# Patient Record
Sex: MALE | Race: WHITE | Employment: FULL TIME | URBAN - METROPOLITAN AREA
[De-identification: names, ages, dates, MRNs, and addresses within clinical notes are randomized per-mention and may not be internally consistent; named-entity substitution may affect disease eponyms.]

---

## 2019-08-19 ENCOUNTER — OFFICE VISIT (OUTPATIENT)
Dept: HEMATOLOGY | Age: 59
End: 2019-08-19

## 2019-08-19 ENCOUNTER — HOSPITAL ENCOUNTER (OUTPATIENT)
Dept: LAB | Age: 59
Discharge: HOME | End: 2019-08-19
Payer: COMMERCIAL

## 2019-08-19 VITALS
DIASTOLIC BLOOD PRESSURE: 78 MMHG | HEIGHT: 73 IN | SYSTOLIC BLOOD PRESSURE: 132 MMHG | WEIGHT: 259.25 LBS | HEART RATE: 73 BPM | OXYGEN SATURATION: 97 % | TEMPERATURE: 98.4 F | BODY MASS INDEX: 34.36 KG/M2

## 2019-08-19 DIAGNOSIS — R74.8 ELEVATED LIVER ENZYMES: ICD-10-CM

## 2019-08-19 DIAGNOSIS — R74.8 ELEVATED LIVER ENZYMES: Primary | ICD-10-CM

## 2019-08-19 PROBLEM — M54.32 SCIATICA OF LEFT SIDE: Status: ACTIVE | Noted: 2019-08-19

## 2019-08-19 PROBLEM — Z90.49 S/P APPENDECTOMY: Status: ACTIVE | Noted: 2019-08-19

## 2019-08-19 PROBLEM — E66.9 OBESITY: Status: ACTIVE | Noted: 2019-08-19

## 2019-08-19 LAB
ALBUMIN SERPL-MCNC: 4.1 G/DL (ref 3.4–5)
ALBUMIN/GLOB SERPL: 1.3 {RATIO} (ref 0.8–1.7)
ALP SERPL-CCNC: 65 U/L (ref 45–117)
ALT SERPL-CCNC: 44 U/L (ref 16–61)
ANION GAP SERPL CALC-SCNC: 9 MMOL/L (ref 3–18)
AST SERPL-CCNC: 18 U/L (ref 10–38)
BASOPHILS # BLD: 0 K/UL (ref 0–0.1)
BASOPHILS NFR BLD: 0 % (ref 0–2)
BILIRUB DIRECT SERPL-MCNC: 0.4 MG/DL (ref 0–0.2)
BILIRUB SERPL-MCNC: 1.8 MG/DL (ref 0.2–1)
BUN SERPL-MCNC: 13 MG/DL (ref 7–18)
BUN/CREAT SERPL: 16 (ref 12–20)
CALCIUM SERPL-MCNC: 8.5 MG/DL (ref 8.5–10.1)
CHLORIDE SERPL-SCNC: 104 MMOL/L (ref 100–111)
CO2 SERPL-SCNC: 29 MMOL/L (ref 21–32)
CREAT SERPL-MCNC: 0.8 MG/DL (ref 0.6–1.3)
DIFFERENTIAL METHOD BLD: ABNORMAL
EOSINOPHIL # BLD: 0 K/UL (ref 0–0.4)
EOSINOPHIL NFR BLD: 0 % (ref 0–5)
ERYTHROCYTE [DISTWIDTH] IN BLOOD BY AUTOMATED COUNT: 13 % (ref 11.6–14.5)
FERRITIN SERPL-MCNC: 289 NG/ML (ref 8–388)
GLOBULIN SER CALC-MCNC: 3.2 G/DL (ref 2–4)
GLUCOSE SERPL-MCNC: 73 MG/DL (ref 74–99)
HCT VFR BLD AUTO: 42.5 % (ref 36–48)
HGB BLD-MCNC: 14 G/DL (ref 13–16)
IRON SATN MFR SERPL: 43 %
IRON SERPL-MCNC: 139 UG/DL (ref 50–175)
LYMPHOCYTES # BLD: 1.8 K/UL (ref 0.9–3.6)
LYMPHOCYTES NFR BLD: 19 % (ref 21–52)
MCH RBC QN AUTO: 29.3 PG (ref 24–34)
MCHC RBC AUTO-ENTMCNC: 32.9 G/DL (ref 31–37)
MCV RBC AUTO: 88.9 FL (ref 74–97)
MONOCYTES # BLD: 0.6 K/UL (ref 0.05–1.2)
MONOCYTES NFR BLD: 7 % (ref 3–10)
NEUTS SEG # BLD: 7.1 K/UL (ref 1.8–8)
NEUTS SEG NFR BLD: 74 % (ref 40–73)
PLATELET # BLD AUTO: 225 K/UL (ref 135–420)
PMV BLD AUTO: 10.3 FL (ref 9.2–11.8)
POTASSIUM SERPL-SCNC: 3.8 MMOL/L (ref 3.5–5.5)
PROT SERPL-MCNC: 7.3 G/DL (ref 6.4–8.2)
RBC # BLD AUTO: 4.78 M/UL (ref 4.7–5.5)
SODIUM SERPL-SCNC: 142 MMOL/L (ref 136–145)
TIBC SERPL-MCNC: 326 UG/DL (ref 250–450)
WBC # BLD AUTO: 9.6 K/UL (ref 4.6–13.2)

## 2019-08-19 PROCEDURE — 80048 BASIC METABOLIC PNL TOTAL CA: CPT

## 2019-08-19 PROCEDURE — 86704 HEP B CORE ANTIBODY TOTAL: CPT

## 2019-08-19 PROCEDURE — 83516 IMMUNOASSAY NONANTIBODY: CPT

## 2019-08-19 PROCEDURE — 86706 HEP B SURFACE ANTIBODY: CPT

## 2019-08-19 PROCEDURE — 83540 ASSAY OF IRON: CPT

## 2019-08-19 PROCEDURE — 86256 FLUORESCENT ANTIBODY TITER: CPT

## 2019-08-19 PROCEDURE — 85025 COMPLETE CBC W/AUTO DIFF WBC: CPT

## 2019-08-19 PROCEDURE — 36415 COLL VENOUS BLD VENIPUNCTURE: CPT

## 2019-08-19 PROCEDURE — 87340 HEPATITIS B SURFACE AG IA: CPT

## 2019-08-19 PROCEDURE — 86708 HEPATITIS A ANTIBODY: CPT

## 2019-08-19 PROCEDURE — 86038 ANTINUCLEAR ANTIBODIES: CPT

## 2019-08-19 PROCEDURE — 86803 HEPATITIS C AB TEST: CPT

## 2019-08-19 PROCEDURE — 82728 ASSAY OF FERRITIN: CPT

## 2019-08-19 PROCEDURE — 82103 ALPHA-1-ANTITRYPSIN TOTAL: CPT

## 2019-08-19 PROCEDURE — 80076 HEPATIC FUNCTION PANEL: CPT

## 2019-08-19 NOTE — PROGRESS NOTES
[address] Eleanor Slater HospitalMD Nancylee Crater, Francisco Ferron, MD America Lemons, PA-C Charm Carrier, [facility]-BC     April ORLANDO Lopez, [facility]   DRE JaimesP-ROBIN Long, [facility]       Frank Bloom [facility] [address]    at [facility] AT [address], [facility] [address]  [address].    [phone]    FAX: [address] [place] [address], [address]    [phone]    FAX: [phone]       Patient Care Team:  Lorie Polo, Magdaleno18 Faith Diaz as PCP - General (Nurse Practitioner)      Problem List  Never Reviewed          Codes Class Noted    Elevated liver enzymes ICD-10-CM: R74.8  ICD-9-CM: 790.5  8/19/2019        S/P appendectomy ICD-10-CM: Z90.49  ICD-9-CM: V45.89  8/19/2019        Sciatica of left side ICD-10-CM: M54.32  ICD-9-CM: 724.3  8/19/2019        Obesity ICD-10-CM: E66.9  ICD-9-CM: 278.00  8/19/2019              The clinicians listed above have asked me to see Brando Martell in consultation regarding elevated liver enzymes and its management. All medical records sent by the referring physicians were reviewed including laboratory studies     The patient is a 63 y.o.  male who was found to have elevated  liver enzymes in 2/2019 during his routine annual medical exam.    Serologic evaluation for markers of chronic liver disease was not performed. Imaging of the liver was not performed. An assessment of liver fibrosis with biopsy or elastography has not been performed. The patient had not started any new medications within 3 months preceding the elevation in liver chemistries. The patient has no symptoms which can be attributed to the liver disorder.     The patient has not experienced the following symptoms:  fatigue, pain in the right side over the liver, yellowing of the eyes or skin,     The patient completes all daily activities without any functional limitations. ASSESSMENT AND PLAN:  Elevated liver enzymes  Intermittent elevation in liver transaminases of unclear etiology at this time. Liver transaminases are now normal.  ALP is normal.  Liver function is normal.  Total bilirubin is   elevated. The platelet count is normal.      Serologic testing for causes of chronic liver disease were negative    Based upon laboratory studies Elastography, and imaging the patient may have significant liver injury. [place] Syndrome  There is a mild elevation in total bilirubin that is mostly indirect fraction consistent with [place] disease. This benign genetic disorder of bilirubin conjugation has no clinical significance. Screening for Hepatocellular Carcinoma  HCC screening is not necessary if the patient has no evidence of cirrhosis. Treatment of other medical problems in patients with chronic liver disease  There are no contraindications for the patient to take most medications that are necessary for treatment of other medical issues. Vaccinations   Since the patient does not have a chronic liver disease which can lead to liver injury screening for HAV and HBV is not needed. Routine vaccinations against other bacterial and viral agents can be performed as indicated. Annual flu vaccination should be administered if indicated. ALLERGIES  Allergies   Allergen Reactions    Penicillins Other (comments)     unkown       MEDICATIONS  No current outpatient medications on file. No current facility-administered medications for this visit. SYSTEM REVIEW NOT RELATED TO LIVER DISEASE OR REVIEWED ABOVE:  Constitution systems: Negative for fever, chills, weight gain, weight loss. Eyes: Negative for visual changes. ENT: Negative for sore throat, painful swallowing.    Respiratory: Negative for cough, hemoptysis, SOB. Cardiology: Negative for chest pain, palpitations. GI:  Negative for constipation or diarrhea. : Negative for urinary frequency, dysuria, hematuria, nocturia. Skin: Negative for rash. Hematology: Negative for easy bruising, blood clots. Musculo-skelatal: Negative for back pain, muscle pain, weakness. Neurologic: Negative for headaches, dizziness, vertigo, memory problems not related to HE. Psychology: Negative for anxiety, depression. FAMILY HISTORY:  The father Has the following following chronic disease(s): Prostate cancer, COPD,   The mother Has the following chronic disease(s): Arthritis  There is no family history of liver disease. There is no family history of immune disorders. SOCIAL HISTORY:  The patient is . The patient has one step child  The patient does not smoke cigarettes. The patient rarely drinks alcohol  The patient currently works full time as a . PHYSICAL EXAMINATION:  Visit Vitals  /78   Pulse 73   Temp 98.4 °F (36.9 °C) (Tympanic)   Ht 6' 1\" (1.854 m)   Wt 259 lb 4 oz (117.6 kg)   SpO2 97%   BMI 34.20 kg/m²     General: No acute distress. Eyes: Sclera anicteric. ENT: No oral lesions. Thyroid normal.  Nodes: No adenopathy. Skin: No spider angiomata. No jaundice. No palmar erythema. Respiratory: Lungs clear to auscultation. Cardiovascular: Regular heart rate. No murmurs. No JVD. Abdomen: Soft non-tender. Liver size normal to percussion/palpation. Spleen not palpable. No obvious ascites. Extremities: No edema. No muscle wasting. No gross arthritic changes. Neurologic: Alert and oriented. Cranial nerves grossly intact. No asterixis.     LABORATORY STUDIES:  From 2/2019  AST/ALT/ALP/T Bili/ALB: 36/50/71/? / 5.1  WBC/HB/PLT/INR:8.0/14.5/201/  HbA1c: 5.6    Liver [place] of [address] Units 8/19/2019   WBC 4.6 - 13.2 K/uL 9.6   ANC 1.8 - 8.0 K/UL 7.1   HGB 13.0 - 16.0 g/dL 14.0  - 420 K/uL 225   AST 10 - 38 U/L 18   ALT 16 - 61 U/L 44   Alk Phos 45 - 117 U/L 65   Bili, Total 0.2 - 1.0 MG/DL 1.8 (H)   Bili, Direct 0.0 - 0.2 MG/DL 0.4 (H)   Albumin 3.4 - 5.0 g/dL 4.1   BUN 7.0 - 18 MG/DL 13   Creat 0.6 - 1.3 MG/DL 0.80   Na 136 - 145 mmol/L 142   K 3.5 - 5.5 mmol/L 3.8   Cl 100 - 111 mmol/L 104   CO2 21 - 32 mmol/L 29   Glucose 74 - 99 mg/dL 73 (L)     SEROLOGIES:  Serologies Latest Ref Rng & Units 8/19/2019   Hep A Ab, Total NEGATIVE   NEGATIVE   Hep B Surface Ag <1.00 Index <0.10   Hep B Surface Ag Interp NEG   NEGATIVE   Hep B Core Ab, Total NEGATIVE   NEGATIVE   Hep B Surface Ab >10.0 mIU/mL <3.10 (L)   Hep B Surface Ab Interp POS   NEGATIVE (A)   Hep C Ab 0.0 - 0.9 s/co ratio 0.1   Ferritin 8 - 388 NG/   Iron % Saturation % 43   AMELIE, IFA  NEGATIVE   C-ANCA Neg:<1:20 titer <1:20   P-ANCA Neg:<1:20 titer <1:20   ANCA Neg:<1:20 titer <1:20   ASMCA 0 - 19 Units 5   M2 Ab 0.0 - 20.0 Units <20.0   Alpha-1 antitrypsin level 90 - 200 mg/dL 112     LIVER HISTOLOGY:  8/2019. Sheer wave elastography performed at THE [facility]. E Range: 7.28-10.69 kPa, E Mean: 8.59 kPa, E Median: 8.04 kPa, E Std: 1.16 kPa. The results suggested a fibrosis level of F2.    ENDOSCOPIC PROCEDURES:  Not available or performed    RADIOLOGY:  8/2019. Ultrasound of liver. Echogenic consistent with chronic liver disease. No liver mass lesions. No dilated bile ducts. No ascites. OTHER TESTING:  Not available or performed    FOLLOW-UP:  All of the issues listed above in the Assessment and Plan were discussed with the patient. All questions were answered. The patient expressed a clear understanding of the above. [address] [address] in 4 weeks for elastography and to review all data and determine the treatment plan.       Marcie Garza MD, MPH  [address]  [address]  [address].  [facility] Jacy Ledbetter MD  I have personally interviewed and examined the patient during the encounter. I have explained the key findings related to the liver disease and other pertinent diagnoses as noted above to the patient and answered all questions. I have reviewed and agree with the findings documented above, including all diagnostic interpretations, and plans. I have edited the original note as appropriate for clarity.     Ajit Matos MD  [address] [facility] [address]  [facility], [address] [place] [address] Kristin Lazar, [address]  [address] [place] [address]

## 2019-08-20 LAB
A1AT SERPL-MCNC: 112 MG/DL (ref 90–200)
ACTIN IGG SERPL-ACNC: 5 UNITS (ref 0–19)
COMMENT, 144067: NORMAL
HAV AB SER QL IA: NEGATIVE
HBV CORE AB SERPL QL IA: NEGATIVE
HBV SURFACE AB SER QL IA: NEGATIVE
HBV SURFACE AB SERPL IA-ACNC: <3.1 MIU/ML
HBV SURFACE AG SER QL: <0.1 INDEX
HBV SURFACE AG SER QL: NEGATIVE
HCV AB S/CO SERPL IA: 0.1 S/CO RATIO (ref 0–0.9)
HEP BS AB COMMENT,HBSAC: ABNORMAL
MITOCHONDRIA M2 IGG SER-ACNC: <20 UNITS (ref 0–20)

## 2019-08-21 LAB
ANA TITR SER IF: NEGATIVE {TITER}
C-ANCA TITR SER IF: NORMAL TITER
P-ANCA ATYPICAL TITR SER IF: NORMAL TITER
P-ANCA TITR SER IF: NORMAL TITER

## 2019-08-30 ENCOUNTER — HOSPITAL ENCOUNTER (OUTPATIENT)
Dept: ULTRASOUND IMAGING | Age: 59
Discharge: HOME | End: 2019-08-30
Payer: COMMERCIAL

## 2019-08-30 DIAGNOSIS — R74.8 ELEVATED LIVER ENZYMES: ICD-10-CM

## 2019-08-30 PROCEDURE — 76981 USE PARENCHYMA: CPT

## 2019-09-17 ENCOUNTER — OFFICE VISIT (OUTPATIENT)
Dept: HEMATOLOGY | Age: 59
End: 2019-09-17

## 2019-09-17 VITALS
DIASTOLIC BLOOD PRESSURE: 77 MMHG | WEIGHT: 249 LBS | HEIGHT: 73 IN | BODY MASS INDEX: 33 KG/M2 | OXYGEN SATURATION: 98 % | HEART RATE: 68 BPM | RESPIRATION RATE: 16 BRPM | TEMPERATURE: 96.9 F | SYSTOLIC BLOOD PRESSURE: 109 MMHG

## 2019-09-17 DIAGNOSIS — K76.0 FATTY LIVER: Primary | ICD-10-CM

## 2019-09-17 NOTE — PROGRESS NOTES
Scot Joseph is a 63 y.o. male      1. Have you been to the ER, urgent care clinic or hospitalized since your last visit? NO.     2. Have you seen or consulted any other health care providers outside of the [address] [place] [address] since your last visit (Include any pap smears or colon screening)?  NO          Learning Assessment 9/17/2019   PRIMARY LEARNER Patient   BARRIERS PRIMARY LEARNER NONE   CO-LEARNER CAREGIVER No   PRIMARY LANGUAGE ENGLISH   LEARNER PREFERENCE PRIMARY LISTENING   ANSWERED BY PATIENT   RELATIONSHIP SELF

## 2019-09-17 NOTE — PROGRESS NOTES
[address] MD, Jaquan Guerra MD Perfecto Se, PA-C Roma Genta, [facility]     Amanda Lopez, [facility]   Arthurlong Cisneros P-ROBIN    Denise Lemons, [address], [address] [address]  [address].    [phone]    FAX: [address] [place] [address], [address]    [phone]    FAX: [phone]     Patient Care Team:  Tereso Hoff as PCP - General (Nurse Practitioner)    Problem List  Never Reviewed          Codes Class Noted    Fatty liver ICD-10-CM: K76.0  ICD-9-CM: 571.8  8/19/2019        S/P appendectomy ICD-10-CM: Z90.49  ICD-9-CM: V45.89  8/19/2019        Sciatica of left side ICD-10-CM: M54.32  ICD-9-CM: 724.3  8/19/2019        Obesity ICD-10-CM: E66.9  ICD-9-CM: 278.00  8/19/2019              Georges Oakes returns to the [address] for management of suspected fatty liver. The active problem list, all pertinent past medical history, medications, liver histology, radiologic findings, and laboratory findings related to the liver disorder were reviewed with the patient. The patient is a 63 y.o.  male who was found to have elevated  liver enzymes in 2/2019 during his routine annual medical exam.    Serologic evaluation for markers of chronic liver disease was negative. The most recent imaging of the liver was Ultrasound performed in 8/2019. Results suggest fatty liver disease. Assessment of liver fibrosis was performed with shear wave elastography at THE [facility] in 8/2019. The result was E mean 8.59 kPa which correlates with mild to moderate fibrosis.     The patient had not started any new medications within 3 months preceding the elevation in liver chemistries. The patient has no symptoms which can be attributed to the liver disorder. The patient has not experienced the following symptoms:  fatigue, pain in the right side over the liver,   yellowing of the eyes or skin,     The patient completes all daily activities without any functional limitations. ASSESSMENT AND PLAN:  Fatty liver  Suspect the patient has fatty liver based upon imaging, serologic studies that are negative for other causes of chronic liver disease,   Elastography performed in 8/2019 suggests stage 2 septal fibrosis. NAFL is a benign form of fatty liver disease and not thought to progress to fibrosis or cirrhosis. If the patient loses 20% of current body weight, which is 51 pounds, down to a weight of 198 pounds, all steatosis will have resolved. Once all steatosis has resolved all inflammation will resolve. Then all fibrosis will gradually resolve and the liver could eventually be normal.    Liver transaminases are normal. ALP is normal. Liver function is normal. The platelet count is normal.    Based upon laboratory studies and imaging  the patient does not appear to have significant liver injury. Serologic testing for causes of chronic liver disease were ordered. All were negative. The need to assess liver fibrosis was discussed. Shear wave elastography can assess liver fibrosis and determine if a patient has advanced fibrosis or cirrhosis without the need for liver biopsy. The elastography can be repeated annually or as often as clinically indicated to assess for fibrosis progression and/or regression. Only a liver biopsy can confirm if the patient does have fatty liver and differentiate NAFL from GARY. The treatment is the same; weight reduction. If the liver biopsy demonstrates GARY the patient could be eligible for enrollment into clinical trials for treatment of GARY.     The need to perform a liver biopsy to help determine the cause and severity of the liver test abnormalities was discussed. The risks of performing the liver biopsy including pain, puncture of the lung, gallbladder, intestine or kidney and bleeding were discussed. There is no reason to perform liver biopsy at this time. Liver chemistries will be monitored. If the liver enzymes remain persistently elevated over the next 1-2 years a liver biopsy should be performed to ensure there is no ongoing chronic liver disease. Counseling for diet and weight loss in patients with confirmed or suspected NAFLD  There is currently no FDA approved medical treatment for fatty liver, NALFD or GARY. The patient was counseled regarding diet and exercise to achieve weight loss. The best diet for patients with fatty liver is one very low in carbohydrates and enriched with protein such as an [employer] program.      He has already lost 10 pounds since his initial visit 8/2019. The patient was told not to consume any food products and drinks containing fructose as this enhances hepatic fat synthesis. There is no medication or vitamin supplements that we advocate for GARY. Using glitazones in patients without diabetes mellitus has been shown to reduce fat content in the liver but has no effect on fibrosis and is associated with weight gain. Vitamin E has also been used but the data is not very good and most experts no longer advocate this. The only medical treatments for GARY are though clinical trials. The patient would be a good candidate for enrollment into a clinical trial if found to have GARY. We will continue to monitor the patient at periodic intervals. If weight loss is successful the fat will resolve from the liver and liver enzymes should return to the normal range.   We would then repeat the ultrasound and Fibroscan to see if this also returns to normal.      [place] Syndrome  There is a mild elevation in total bilirubin that is mostly indirect fraction consistent with [place] disease. This benign genetic disorder of bilirubin conjugation has no clinical significance. Screening for Hepatocellular Carcinoma  HCC screening is not necessary if the patient has no evidence of cirrhosis. Treatment of other medical problems in patients with chronic liver disease  There are no contraindications for the patient to take most medications that are necessary for treatment of other medical issues. Vaccinations   Vaccination for viral hepatitis A and B is recommended since the patient has no serologic evidence of previous exposure or vaccination with immunity. Routine vaccinations against other bacterial and viral agents can be performed as indicated. Annual flu vaccination should be administered if indicated. ALLERGIES  Allergies   Allergen Reactions    Penicillins Other (comments)     unkown       MEDICATIONS  No current outpatient medications on file. No current facility-administered medications for this visit. SYSTEM REVIEW NOT RELATED TO LIVER DISEASE OR REVIEWED ABOVE:  Constitution systems: Negative for fever, chills, weight gain, weight loss. Eyes: Negative for visual changes. ENT: Negative for sore throat, painful swallowing. Respiratory: Negative for cough, hemoptysis, SOB. Cardiology: Negative for chest pain, palpitations. GI:  Negative for constipation or diarrhea. : Negative for urinary frequency, dysuria, hematuria, nocturia. Skin: Negative for rash. Hematology: Negative for easy bruising, blood clots. Musculo-skelatal: Negative for back pain, muscle pain, weakness. Neurologic: Negative for headaches, dizziness, vertigo, memory problems not related to HE. Psychology: Negative for anxiety, depression.        FAMILY HISTORY:  The father has the following following chronic disease(s): Prostate cancer, COPD  The mother has the following chronic disease(s): Arthritis  There is no family history of liver disease. There is no family history of immune disorders. SOCIAL HISTORY:  The patient is . The patient has one step child  The patient does not smoke cigarettes. The patient rarely drinks alcohol  The patient currently works full time as a . PHYSICAL EXAMINATION:  Visit Vitals  /77 (BP 1 Location: Right arm, BP Patient Position: Sitting)   Pulse 68   Temp 96.9 °F (36.1 °C) (Tympanic)   Resp 16   Ht 6' 1\" (1.854 m)   Wt 249 lb (112.9 kg)   SpO2 98%   BMI 32.85 kg/m²     General: No acute distress. Eyes: Sclera anicteric. ENT: No oral lesions. Thyroid normal.  Nodes: No adenopathy. Skin: No spider angiomata. No jaundice. No palmar erythema. Respiratory: Lungs clear to auscultation. Cardiovascular: Regular heart rate. No murmurs. No JVD. Abdomen: Soft non-tender. Liver size normal to percussion/palpation. Spleen not palpable. No obvious ascites. Extremities: No edema. No muscle wasting. No gross arthritic changes. Neurologic: Alert and oriented. Cranial nerves grossly intact. No asterixis.     LABORATORY STUDIES:  From 2/2019  AST/ALT/ALP/T Bili/ALB: 36/50/71/? / 5.1  WBC/HB/PLT/INR:8.0/14.5/201/  BUN/CREAT:  HbA1c: 5.6    Liver [place] of [address] Units 8/19/2019   WBC 4.6 - 13.2 K/uL 9.6   ANC 1.8 - 8.0 K/UL 7.1   HGB 13.0 - 16.0 g/dL 14.0    - 420 K/uL 225   AST 10 - 38 U/L 18   ALT 16 - 61 U/L 44   Alk Phos 45 - 117 U/L 65   Bili, Total 0.2 - 1.0 MG/DL 1.8 (H)   Bili, Direct 0.0 - 0.2 MG/DL 0.4 (H)   Albumin 3.4 - 5.0 g/dL 4.1   BUN 7.0 - 18 MG/DL 13   Creat 0.6 - 1.3 MG/DL 0.80   Na 136 - 145 mmol/L 142   K 3.5 - 5.5 mmol/L 3.8   Cl 100 - 111 mmol/L 104   CO2 21 - 32 mmol/L 29   Glucose 74 - 99 mg/dL 73 (L)       SEROLOGIES:  Serologies Latest Ref Rng & Units 8/19/2019   Hep A Ab, Total NEGATIVE   NEGATIVE   Hep B Surface Ag <1.00 Index <0.10   Hep B Surface Ag Interp NEG   NEGATIVE   Hep B Core Ab, Total NEGATIVE NEGATIVE   Hep B Surface Ab >10.0 mIU/mL <3.10 (L)   Hep B Surface Ab Interp POS   NEGATIVE (A)   Hep C Ab 0.0 - 0.9 s/co ratio 0.1   Ferritin 8 - 388 NG/   Iron % Saturation % 43   AMELIE, IFA  NEGATIVE   C-ANCA Neg:<1:20 titer <1:20   P-ANCA Neg:<1:20 titer <1:20   ANCA Neg:<1:20 titer <1:20   ASMCA 0 - 19 Units 5   M2 Ab 0.0 - 20.0 Units <20.0   Alpha-1 antitrypsin level 90 - 200 mg/dL 112       LIVER HISTOLOGY:  8/2019. Shear wave elastography performed at THE [facility]. EkPa was E Range: 7.28-10.69 kPa, E Mean: 8.59 kPa, E Median: 8.04 kPa, E Std: 1.16 kPa. The results suggested a fibrosis level of F2.      ENDOSCOPIC PROCEDURES:  Not available or performed    RADIOLOGY:  8/2019. Ultrasound of liver. Echogenic consistent with fatty liver. No liver mass lesions. No dilated bile ducts. No ascites. OTHER TESTING:  Not available or performed      FOLLOW-UP:  All of the issues listed above in the Assessment and Plan were discussed with the patient. All questions were answered. The patient expressed a clear understanding of the above. [facility] 87 in 6 months to assess for the effects of diet changes and weight loss.       Chrissy Clubs, [facility] CenterNP-BC  [address] [place] [address], [facility] [address]  [facility], [address] [place] [address] - [address]  [phone]